# Patient Record
Sex: FEMALE | Race: WHITE | NOT HISPANIC OR LATINO | Employment: OTHER
[De-identification: names, ages, dates, MRNs, and addresses within clinical notes are randomized per-mention and may not be internally consistent; named-entity substitution may affect disease eponyms.]

---

## 2017-01-27 ENCOUNTER — FOLLOW UP (OUTPATIENT)
Age: 82
End: 2017-01-27

## 2017-01-30 ASSESSMENT — TONOMETRY
OD_IOP_MMHG: 13
OS_IOP_MMHG: 11

## 2017-01-30 ASSESSMENT — VISUAL ACUITY
OD_CC: 20/40
OS_CC: CF 3FT

## 2017-07-21 ENCOUNTER — FOLLOW UP (OUTPATIENT)
Age: 82
End: 2017-07-21

## 2017-07-21 NOTE — PROCEDURE NOTE: CLINICAL
PROCEDURE NOTE: Avastin () #12 OD. Intravitreal injection of Avastin 1.25mg/0.05 ml was given. Injection site: 3-4 mm from the limbus. Patient tolerated procedure well. CF vision checked. There were no complications. Post-op instructions given. Lot #: [email or web address]. Expiration Date: 9515-37-10T84:00:00. Inventory Id: viviana Henry PROCEDURE NOTE: Avastin () #12 OD. Diagnosis: Neovascular AMD with Active CNV. Intravitreal injection of Avastin 1.25mg/0.05 ml was given. Injection site: 3-4 mm from the limbus. Patient tolerated procedure well. CF vision checked. There were no complications. Post-op instructions given. Lot #: [id number]. Expiration Date: . EXP. Inventory Id: viviana Henry

## 2017-07-21 NOTE — PATIENT DISCUSSION
Avastin recommended today due to an increase in edema seen on exam and OCT today. I will see her again in 1 month for repeat exam and OCT, with the possibility of an Avastin. Avastin # 12 OD done.

## 2017-07-24 ASSESSMENT — VISUAL ACUITY
OS_SC: CF 2FT
OD_SC: 20/40

## 2017-07-24 ASSESSMENT — TONOMETRY
OD_IOP_MMHG: 17
OS_IOP_MMHG: 12
OD_IOP_MMHG: 13
OS_IOP_MMHG: 15

## 2017-07-26 ASSESSMENT — VISUAL ACUITY
OD_CC: 20/50+2
OS_CC: CF 1FT

## 2017-08-25 ENCOUNTER — FOLLOW UP (OUTPATIENT)
Age: 82
End: 2017-08-25

## 2017-08-25 NOTE — PROCEDURE NOTE: CLINICAL
PROCEDURE NOTE: Avastin () #13 OD. Diagnosis: Neovascular AMD with Active CNV. Anesthesia: Topical. Prep: Betadine Drops and Betadine Scrub. Intravitreal injection of Avastin 1.25mg/0.05 ml was given. Injection site: 3-4 mm from the limbus. Patient tolerated procedure well. CF vision checked. There were no complications. Post-op instructions given. Lot #: [email or web address]. Expiration Date: Mon Oct 16 2017 00:00:00 [facility]-[address] Daylight Time). Inventory Id: viviana Cuellar

## 2017-08-28 ASSESSMENT — TONOMETRY: OD_IOP_MMHG: 14

## 2017-08-28 ASSESSMENT — VISUAL ACUITY
OS_CC: CF 1FT
OD_CC: 20/40

## 2017-10-20 ENCOUNTER — FOLLOW UP (OUTPATIENT)
Age: 82
End: 2017-10-20

## 2017-10-20 NOTE — PATIENT DISCUSSION
Avastin recommended today after discussion of benefits, risks and alternatives. The injection was given and tolerated well by patient. Post-injection instructions were reviewed and understood by the patient. I would like to do 2 more Avastin injections over the next 2 month period.

## 2017-10-20 NOTE — PROCEDURE NOTE: CLINICAL
PROCEDURE NOTE: Avastin () #14 OD. Diagnosis: Neovascular AMD with Active CNV. Anesthesia: Topical. Prep: Betadine Drops and Betadine Scrub. Intravitreal injection of Avastin 1.25mg/0.05 ml was given. Injection site: 3-4 mm from the limbus. Patient tolerated procedure well. CF vision checked. There were no complications. Post-op instructions given. Lot #: [email or web address]. Expiration Date: . EXP. Inventory Id: srinath. Amie Jennings

## 2017-10-23 ASSESSMENT — VISUAL ACUITY
OS_CC: CF 1FT
OD_CC: 20/40-2

## 2017-10-23 ASSESSMENT — TONOMETRY: OD_IOP_MMHG: 14

## 2018-02-09 ENCOUNTER — FOLLOW UP (OUTPATIENT)
Age: 83
End: 2018-02-09

## 2018-02-12 ASSESSMENT — VISUAL ACUITY
OD_PH: 20/15
OD_CC: 20/40-1
OS_CC: CF 1FT

## 2018-02-12 ASSESSMENT — TONOMETRY: OD_IOP_MMHG: 14

## 2018-03-16 ENCOUNTER — FOLLOW UP (OUTPATIENT)
Age: 83
End: 2018-03-16

## 2018-03-19 ASSESSMENT — TONOMETRY: OD_IOP_MMHG: 18

## 2018-03-19 ASSESSMENT — VISUAL ACUITY
OD_CC: 20/40-1
OS_CC: CF 1FT

## 2018-05-18 ENCOUNTER — FOLLOW UP (OUTPATIENT)
Age: 83
End: 2018-05-18

## 2018-05-21 ASSESSMENT — TONOMETRY
OD_IOP_MMHG: 15
OS_IOP_MMHG: 13

## 2018-05-21 ASSESSMENT — VISUAL ACUITY
OD_CC: 20/40
OS_CC: CF 1FT

## 2018-08-17 ENCOUNTER — FOLLOW UP (OUTPATIENT)
Age: 83
End: 2018-08-17

## 2018-08-20 ASSESSMENT — TONOMETRY
OS_IOP_MMHG: 13
OD_IOP_MMHG: 13

## 2018-08-20 ASSESSMENT — VISUAL ACUITY
OD_CC: 20/40
OS_CC: CF 1FT

## 2018-12-14 ENCOUNTER — FOLLOW UP (OUTPATIENT)
Age: 83
End: 2018-12-14

## 2018-12-17 ASSESSMENT — VISUAL ACUITY
OD_CC: 20/40-2
OS_CC: CF 1FT

## 2018-12-17 ASSESSMENT — TONOMETRY
OD_IOP_MMHG: 12
OS_IOP_MMHG: 13

## 2019-04-05 ENCOUNTER — FOLLOW UP (OUTPATIENT)
Age: 84
End: 2019-04-05

## 2019-04-08 ASSESSMENT — VISUAL ACUITY
OD_CC: 20/50+2
OS_CC: CF 2FT

## 2019-04-08 ASSESSMENT — TONOMETRY
OD_IOP_MMHG: 17
OS_IOP_MMHG: 18

## 2019-07-26 ENCOUNTER — FOLLOW UP (OUTPATIENT)
Age: 84
End: 2019-07-26

## 2019-07-29 ASSESSMENT — VISUAL ACUITY
OD_CC: 20/40-2
OS_CC: CF 2FT

## 2019-07-29 ASSESSMENT — TONOMETRY
OS_IOP_MMHG: 15
OD_IOP_MMHG: 11

## 2020-01-17 ENCOUNTER — FOLLOW UP (OUTPATIENT)
Age: 85
End: 2020-01-17

## 2020-01-17 NOTE — PATIENT DISCUSSION
Recommended that Mrs. Hoffmann return to see me in 6 months. Mrs. Hoffmann had complaints of dry eye and I discussed using Page Rodriguez and gave her a prescription. She will think about this but will also consult with Dr. Viola Perdue.

## 2020-01-20 ASSESSMENT — TONOMETRY
OS_IOP_MMHG: 10
OD_IOP_MMHG: 13

## 2020-01-20 ASSESSMENT — VISUAL ACUITY
OS_SC: CF 3FT
OD_SC: 20/40

## 2020-08-03 ENCOUNTER — PREPPED CHART (OUTPATIENT)
Dept: URBAN - METROPOLITAN AREA CLINIC 20 | Facility: CLINIC | Age: 85
End: 2020-08-03

## 2020-08-07 ENCOUNTER — FOLLOW UP (OUTPATIENT)
Age: 85
End: 2020-08-07

## 2020-08-07 NOTE — PATIENT DISCUSSION
Mrs. Tim Pickard macular condition remains stable since her last visit. I have explained to her that I will be closing this office in the next few weeks. She would like to continue seeing me and will travel to the [facility] office.

## 2020-08-12 ASSESSMENT — VISUAL ACUITY
OS_CC: CF 1FT
OD_CC: 20/40-2

## 2020-08-12 ASSESSMENT — TONOMETRY
OD_IOP_MMHG: 12
OS_IOP_MMHG: 10

## 2021-02-02 ENCOUNTER — FOLLOW UP (OUTPATIENT)
Dept: URBAN - METROPOLITAN AREA CLINIC 11 | Facility: CLINIC | Age: 86
End: 2021-02-02

## 2021-02-02 DIAGNOSIS — H43.813: ICD-10-CM

## 2021-02-02 DIAGNOSIS — H35.3124: ICD-10-CM

## 2021-02-02 DIAGNOSIS — H35.3212: ICD-10-CM

## 2021-02-02 PROCEDURE — G9744 PT NOT ELI D/T ACT DIG HTN: HCPCS

## 2021-02-02 PROCEDURE — G8427 DOCREV CUR MEDS BY ELIG CLIN: HCPCS

## 2021-02-02 PROCEDURE — 99214 OFFICE O/P EST MOD 30 MIN: CPT

## 2021-02-02 PROCEDURE — G8756 NO BP MEASURE DOC: HCPCS

## 2021-02-02 PROCEDURE — 4177F TALK PT/CRGVR RE AREDS PREV: CPT

## 2021-02-02 PROCEDURE — 1036F TOBACCO NON-USER: CPT

## 2021-02-02 PROCEDURE — G9974 MAC EXAM PERF: HCPCS

## 2021-02-02 PROCEDURE — G9903 PT SCRN TBCO ID AS NON USER: HCPCS

## 2021-02-02 PROCEDURE — 92134 CPTRZ OPH DX IMG PST SGM RTA: CPT

## 2021-02-02 ASSESSMENT — VISUAL ACUITY
OD_CC: 20/50+2
OS_CC: CF 1FT

## 2021-02-02 ASSESSMENT — TONOMETRY
OS_IOP_MMHG: 11
OD_IOP_MMHG: 13

## 2021-02-02 NOTE — PATIENT DISCUSSION
35 minutes spent in face to face dilaogue with Mrs Hoffmann. I have explained that her macular condition remains stable since her last visit. I discussed the progression of her cataracts and patient feels comfortable at her current level of vision.  I have asked her to see me again in six months, or sooner, if she has any problems or concerns.

## 2021-08-31 ENCOUNTER — FOLLOW UP (OUTPATIENT)
Dept: URBAN - METROPOLITAN AREA CLINIC 11 | Facility: CLINIC | Age: 86
End: 2021-08-31

## 2021-08-31 DIAGNOSIS — H35.3212: ICD-10-CM

## 2021-08-31 DIAGNOSIS — H43.813: ICD-10-CM

## 2021-08-31 DIAGNOSIS — H35.3124: ICD-10-CM

## 2021-08-31 PROCEDURE — 99214 OFFICE O/P EST MOD 30 MIN: CPT

## 2021-08-31 PROCEDURE — 92134 CPTRZ OPH DX IMG PST SGM RTA: CPT

## 2021-08-31 ASSESSMENT — VISUAL ACUITY
OS_CC: CF 1FT
OD_CC: 20/60-2

## 2021-08-31 ASSESSMENT — TONOMETRY
OD_IOP_MMHG: 15
OS_IOP_MMHG: 12

## 2021-08-31 NOTE — PATIENT DISCUSSION
35 minutes spent in face to face dilaogue with Mrs Hoffmann. I have explained that her macular condition remains stable since her last visit. I discussed the progression of her cataracts and I have recommended against cataract surgery.  I would like for her to see me again in six months, or sooner, if she has any problems or concerns.

## 2022-01-18 ASSESSMENT — TONOMETRY
OS_IOP_MMHG: 11
OD_IOP_MMHG: 11

## 2022-01-18 ASSESSMENT — VISUAL ACUITY: OD_CC: 20/60

## 2022-01-19 ENCOUNTER — ESTABLISHED PATIENT (OUTPATIENT)
Dept: URBAN - METROPOLITAN AREA CLINIC 20 | Facility: CLINIC | Age: 87
End: 2022-01-19

## 2022-01-19 DIAGNOSIS — H35.3133: ICD-10-CM

## 2022-01-19 DIAGNOSIS — H52.13: ICD-10-CM

## 2022-01-19 PROCEDURE — 92015 DETERMINE REFRACTIVE STATE: CPT

## 2022-01-19 PROCEDURE — 92014 COMPRE OPH EXAM EST PT 1/>: CPT

## 2022-01-19 ASSESSMENT — TONOMETRY
OS_IOP_MMHG: 8
OD_IOP_MMHG: 11

## 2022-01-19 ASSESSMENT — KERATOMETRY
OS_AXISANGLE_DEGREES: 87
OD_K2POWER_DIOPTERS: 43.50
OD_AXISANGLE_DEGREES: 165
OS_K2POWER_DIOPTERS: 43.50
OS_K1POWER_DIOPTERS: 42.75
OS_AXISANGLE2_DEGREES: 177
OD_AXISANGLE2_DEGREES: 75
OD_K1POWER_DIOPTERS: 43.00

## 2022-01-19 ASSESSMENT — VISUAL ACUITY
OU_SC: J3
OD_CC: 20/50

## 2022-01-19 NOTE — PATIENT DISCUSSION
(Stable): Discussed the difference between dry and wet macular degeneration. Retina appears stable at today's exam. Continue monitoring at home [address] once weekly. Use UV protection when outside, do not smoke, and add more leafy green vegetables to diet. Reviewed instructions for [employer] use. Call our office immediately if any changes occur on the [address]. Monitor.

## 2022-05-26 ENCOUNTER — LAB OUTSIDE AN ENCOUNTER (OUTPATIENT)
Dept: URBAN - METROPOLITAN AREA CLINIC 72 | Facility: CLINIC | Age: 87
End: 2022-05-26

## 2022-05-26 ENCOUNTER — OFFICE VISIT (OUTPATIENT)
Dept: URBAN - METROPOLITAN AREA CLINIC 72 | Facility: CLINIC | Age: 87
End: 2022-05-26
Payer: MEDICARE

## 2022-05-26 VITALS
TEMPERATURE: 98.4 F | BODY MASS INDEX: 22.88 KG/M2 | HEIGHT: 64 IN | DIASTOLIC BLOOD PRESSURE: 77 MMHG | SYSTOLIC BLOOD PRESSURE: 152 MMHG | HEART RATE: 68 BPM | WEIGHT: 134 LBS

## 2022-05-26 DIAGNOSIS — K21.9 GASTROESOPHAGEAL REFLUX DISEASE, UNSPECIFIED WHETHER ESOPHAGITIS PRESENT: ICD-10-CM

## 2022-05-26 DIAGNOSIS — R13.19 ESOPHAGEAL DYSPHAGIA: ICD-10-CM

## 2022-05-26 PROCEDURE — 99204 OFFICE O/P NEW MOD 45 MIN: CPT | Performed by: INTERNAL MEDICINE

## 2022-05-26 RX ORDER — VIT A/VIT C/VIT E/ZINC/COPPER 4296-226
AS DIRECTED CAPSULE ORAL
Status: ACTIVE | COMMUNITY

## 2022-05-26 RX ORDER — IRBESARTAN 300 MG/1
1 TABLET TABLET, FILM COATED ORAL ONCE A DAY
Status: ACTIVE | COMMUNITY

## 2022-05-26 RX ORDER — GABAPENTIN 300 MG/1
1 CAPSULE CAPSULE ORAL ONCE A DAY
Status: ACTIVE | COMMUNITY

## 2022-05-26 RX ORDER — SIMVASTATIN 20 MG/1
1 TABLET IN THE EVENING TABLET, FILM COATED ORAL ONCE A DAY
Status: ACTIVE | COMMUNITY

## 2022-05-26 RX ORDER — OMEPRAZOLE 20 MG/1
1 CAPSULE 30 MINUTES BEFORE MORNING MEAL CAPSULE, DELAYED RELEASE ORAL ONCE A DAY
Status: ACTIVE | COMMUNITY

## 2022-05-26 RX ORDER — CARVEDILOL 12.5 MG/1
1 TABLET WITH FOOD TABLET, FILM COATED ORAL TWICE A DAY
Status: ACTIVE | COMMUNITY

## 2022-05-26 RX ORDER — LEVOTHYROXINE SODIUM 25 UG/1
1 TABLET IN THE MORNING ON AN EMPTY STOMACH TABLET ORAL ONCE A DAY
Status: ACTIVE | COMMUNITY

## 2022-05-26 RX ORDER — ZOLPIDEM TARTRATE 10 MG/1
1 TABLET AT BEDTIME AS NEEDED TABLET, FILM COATED ORAL ONCE A DAY
Status: ACTIVE | COMMUNITY

## 2022-05-26 NOTE — HPI-TODAY'S VISIT:
Mrs. Bassett is a pleasant 86-year-old who presents as a new patient for consultation for diarrhea.  Lab work from 2/10/2022 available for review showed WBC 7.8, hemoglobin 13.6, platelets 253, creatinine 1.03, sodium 146 normal liver associated enzymes   She reports a history of irritable bowel syndrome.  She has a longstanding issue of reflux, on omeprazole 20 mg daily.  Has a strong family history of GI cancers mostly gastric.  She has had issues with dysphagia but worsened over the last 6 months with regurgitation of food.  She notes that she has had intermittent dilatations in the past.  Feels she is about due for 1.  Denies any weight loss.  Has had some worsening heartburn.

## 2022-05-27 ENCOUNTER — OFFICE VISIT (OUTPATIENT)
Dept: URBAN - METROPOLITAN AREA MEDICAL CENTER 40 | Facility: MEDICAL CENTER | Age: 87
End: 2022-05-27
Payer: MEDICARE

## 2022-05-27 DIAGNOSIS — R13.19 DYSPHAGIA: ICD-10-CM

## 2022-05-27 DIAGNOSIS — K31.A12 INTESTINAL METAPLASIA OF BODY OF STOMACH WITHOUT DYSPLASIA: ICD-10-CM

## 2022-05-27 DIAGNOSIS — K22.70 BARRETT ESOPHAGUS: ICD-10-CM

## 2022-05-27 DIAGNOSIS — K31.89 GASTRIC MASS: ICD-10-CM

## 2022-05-27 DIAGNOSIS — K21.9 ACID REFLUX: ICD-10-CM

## 2022-05-27 DIAGNOSIS — K29.60 ADENOPAPILLOMATOSIS GASTRICA: ICD-10-CM

## 2022-05-27 PROCEDURE — 43249 ESOPH EGD DILATION <30 MM: CPT | Performed by: INTERNAL MEDICINE

## 2022-05-27 PROCEDURE — 43239 EGD BIOPSY SINGLE/MULTIPLE: CPT | Performed by: INTERNAL MEDICINE

## 2022-06-15 ENCOUNTER — CLAIMS CREATED FROM THE CLAIM WINDOW (OUTPATIENT)
Dept: URBAN - METROPOLITAN AREA CLINIC 72 | Facility: CLINIC | Age: 87
End: 2022-06-15
Payer: MEDICARE

## 2022-06-15 ENCOUNTER — WEB ENCOUNTER (OUTPATIENT)
Dept: URBAN - METROPOLITAN AREA CLINIC 72 | Facility: CLINIC | Age: 87
End: 2022-06-15

## 2022-06-15 VITALS
SYSTOLIC BLOOD PRESSURE: 158 MMHG | WEIGHT: 135.4 LBS | HEIGHT: 64 IN | DIASTOLIC BLOOD PRESSURE: 83 MMHG | BODY MASS INDEX: 23.12 KG/M2 | TEMPERATURE: 98.7 F | HEART RATE: 81 BPM

## 2022-06-15 DIAGNOSIS — R10.32 LLQ PAIN: ICD-10-CM

## 2022-06-15 DIAGNOSIS — K31.89 SUBMUCOSAL LESION OF STOMACH: ICD-10-CM

## 2022-06-15 DIAGNOSIS — K21.9 GASTROESOPHAGEAL REFLUX DISEASE, UNSPECIFIED WHETHER ESOPHAGITIS PRESENT: ICD-10-CM

## 2022-06-15 DIAGNOSIS — R13.19 ESOPHAGEAL DYSPHAGIA: ICD-10-CM

## 2022-06-15 DIAGNOSIS — K22.70 BARRETT'S ESOPHAGUS WITHOUT DYSPLASIA: ICD-10-CM

## 2022-06-15 PROCEDURE — 99214 OFFICE O/P EST MOD 30 MIN: CPT | Performed by: INTERNAL MEDICINE

## 2022-06-15 RX ORDER — VIT A/VIT C/VIT E/ZINC/COPPER 4296-226
AS DIRECTED CAPSULE ORAL
Status: ACTIVE | COMMUNITY

## 2022-06-15 RX ORDER — LEVOTHYROXINE SODIUM 25 UG/1
1 TABLET IN THE MORNING ON AN EMPTY STOMACH TABLET ORAL ONCE A DAY
Status: ACTIVE | COMMUNITY

## 2022-06-15 RX ORDER — TRAMADOL HYDROCHLORIDE 5 MG/ML
5 ML 1 TO 3 HOURS BEFORE SEXUAL ACTIVITY AS NEEDED SOLUTION ORAL ONCE A DAY
Status: ACTIVE | COMMUNITY

## 2022-06-15 RX ORDER — OMEPRAZOLE 20 MG/1
1 CAPSULE 30 MINUTES BEFORE MORNING MEAL CAPSULE, DELAYED RELEASE ORAL ONCE A DAY
Status: ACTIVE | COMMUNITY

## 2022-06-15 RX ORDER — SIMVASTATIN 20 MG/1
1 TABLET IN THE EVENING TABLET, FILM COATED ORAL ONCE A DAY
Status: ACTIVE | COMMUNITY

## 2022-06-15 RX ORDER — GABAPENTIN 300 MG/1
1 CAPSULE CAPSULE ORAL ONCE A DAY
Status: ACTIVE | COMMUNITY

## 2022-06-15 RX ORDER — IRBESARTAN 300 MG/1
1 TABLET TABLET, FILM COATED ORAL ONCE A DAY
Status: DISCONTINUED | COMMUNITY

## 2022-06-15 RX ORDER — CARVEDILOL 12.5 MG/1
1 TABLET WITH FOOD TABLET, FILM COATED ORAL TWICE A DAY
Status: ACTIVE | COMMUNITY

## 2022-06-15 RX ORDER — ZOLPIDEM TARTRATE 10 MG/1
1 TABLET AT BEDTIME AS NEEDED TABLET, FILM COATED ORAL ONCE A DAY
Status: ACTIVE | COMMUNITY

## 2022-06-15 NOTE — HPI-TODAY'S VISIT:
Mrs. Bassett returns for follow-up.  Recall she is an 86-year-old last seen in our office on 5/26/2022 for consultation for diarrhea.  She has a history of irritable bowel syndrome.  She reported strong family history of gastric cancers and had a personal history of reflux despite omeprazole 20 mg daily.  She noted intermittent issues with dysphagia that had worsened over the last 6 months.  She has required intermittent dilatations in the past and felt like she was ready to have 1.  She ultimately underwent an EGD on 5/27/2022, this was performed without difficulty, there was gastritis throughout the stomach with continued mucosa with erosion in nodularity severe in nature.  A large submucosal mass was identified and not biopsied.  The esophagus was normal with GE junction at 39 cm from the incisors.  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  A repeat EGD was recommended in 3 years if healthy has not.  We discussed consideration of CT scan for the large submucosal mass.She was dilated during her upper endoscopy, reports that since that time she is actually having more issues with dysphagia than before.  Eats food feels like it gets stuck and has to be regurgitated for drink lots of water to get it to go down.  She has never had a barium esophagram to her knowledge. In addition she reports that she has been experiencing left lower quadrant pain, initially started earlier this month but has worsened.  Comes and goes in the left lower quadrant sharp at times.

## 2022-07-11 ENCOUNTER — TELEPHONE ENCOUNTER (OUTPATIENT)
Dept: URBAN - METROPOLITAN AREA CLINIC 72 | Facility: CLINIC | Age: 87
End: 2022-07-11

## 2022-07-12 ENCOUNTER — TELEPHONE ENCOUNTER (OUTPATIENT)
Dept: URBAN - METROPOLITAN AREA CLINIC 72 | Facility: CLINIC | Age: 87
End: 2022-07-12

## 2022-08-09 ENCOUNTER — TELEPHONE ENCOUNTER (OUTPATIENT)
Dept: URBAN - METROPOLITAN AREA CLINIC 72 | Facility: CLINIC | Age: 87
End: 2022-08-09

## 2022-08-15 ENCOUNTER — OFFICE VISIT (OUTPATIENT)
Dept: URBAN - METROPOLITAN AREA MEDICAL CENTER 40 | Facility: MEDICAL CENTER | Age: 87
End: 2022-08-15
Payer: MEDICARE

## 2022-08-15 DIAGNOSIS — R93.3 ABN FINDINGS-GI TRACT: ICD-10-CM

## 2022-08-15 DIAGNOSIS — K31.89 SUBMUCOSAL LESION OF STOMACH: ICD-10-CM

## 2022-08-15 PROCEDURE — 43239 EGD BIOPSY SINGLE/MULTIPLE: CPT | Performed by: INTERNAL MEDICINE

## 2022-08-23 ENCOUNTER — TELEPHONE ENCOUNTER (OUTPATIENT)
Dept: URBAN - METROPOLITAN AREA CLINIC 72 | Facility: CLINIC | Age: 87
End: 2022-08-23

## 2022-08-23 ENCOUNTER — LAB OUTSIDE AN ENCOUNTER (OUTPATIENT)
Dept: URBAN - METROPOLITAN AREA CLINIC 72 | Facility: CLINIC | Age: 87
End: 2022-08-23

## 2022-08-31 ENCOUNTER — TELEPHONE ENCOUNTER (OUTPATIENT)
Dept: URBAN - METROPOLITAN AREA CLINIC 113 | Facility: CLINIC | Age: 87
End: 2022-08-31

## 2022-09-13 ENCOUNTER — OFFICE VISIT (OUTPATIENT)
Dept: URBAN - METROPOLITAN AREA MEDICAL CENTER 2 | Facility: MEDICAL CENTER | Age: 87
End: 2022-09-13
Payer: MEDICARE

## 2022-09-13 DIAGNOSIS — C49.A2 GASTRIC STROMAL TUMOR: ICD-10-CM

## 2022-09-13 PROCEDURE — 43238 EGD US FINE NEEDLE BX/ASPIR: CPT | Performed by: INTERNAL MEDICINE

## 2022-09-13 RX ORDER — CARVEDILOL 12.5 MG/1
1 TABLET WITH FOOD TABLET, FILM COATED ORAL TWICE A DAY
Status: ACTIVE | COMMUNITY

## 2022-09-13 RX ORDER — OMEPRAZOLE 20 MG/1
1 CAPSULE 30 MINUTES BEFORE MORNING MEAL CAPSULE, DELAYED RELEASE ORAL ONCE A DAY
Status: ACTIVE | COMMUNITY

## 2022-09-13 RX ORDER — LEVOTHYROXINE SODIUM 25 UG/1
1 TABLET IN THE MORNING ON AN EMPTY STOMACH TABLET ORAL ONCE A DAY
Status: ACTIVE | COMMUNITY

## 2022-09-13 RX ORDER — SIMVASTATIN 20 MG/1
1 TABLET IN THE EVENING TABLET, FILM COATED ORAL ONCE A DAY
Status: ACTIVE | COMMUNITY

## 2022-09-13 RX ORDER — VIT A/VIT C/VIT E/ZINC/COPPER 4296-226
AS DIRECTED CAPSULE ORAL
Status: ACTIVE | COMMUNITY

## 2022-09-13 RX ORDER — TRAMADOL HYDROCHLORIDE 5 MG/ML
5 ML 1 TO 3 HOURS BEFORE SEXUAL ACTIVITY AS NEEDED SOLUTION ORAL ONCE A DAY
Status: ACTIVE | COMMUNITY

## 2022-09-13 RX ORDER — ZOLPIDEM TARTRATE 10 MG/1
1 TABLET AT BEDTIME AS NEEDED TABLET, FILM COATED ORAL ONCE A DAY
Status: ACTIVE | COMMUNITY

## 2022-09-13 RX ORDER — GABAPENTIN 300 MG/1
1 CAPSULE CAPSULE ORAL ONCE A DAY
Status: ACTIVE | COMMUNITY

## 2022-09-15 ENCOUNTER — TELEPHONE ENCOUNTER (OUTPATIENT)
Dept: URBAN - METROPOLITAN AREA CLINIC 113 | Facility: CLINIC | Age: 87
End: 2022-09-15

## 2022-09-17 ENCOUNTER — TELEPHONE ENCOUNTER (OUTPATIENT)
Dept: URBAN - METROPOLITAN AREA CLINIC 113 | Facility: CLINIC | Age: 87
End: 2022-09-17

## 2022-09-19 ENCOUNTER — TELEPHONE ENCOUNTER (OUTPATIENT)
Dept: URBAN - METROPOLITAN AREA CLINIC 72 | Facility: CLINIC | Age: 87
End: 2022-09-19

## 2022-09-20 ENCOUNTER — OFFICE VISIT (OUTPATIENT)
Dept: URBAN - METROPOLITAN AREA CLINIC 72 | Facility: CLINIC | Age: 87
End: 2022-09-20
Payer: MEDICARE

## 2022-09-20 VITALS
WEIGHT: 128 LBS | BODY MASS INDEX: 21.85 KG/M2 | SYSTOLIC BLOOD PRESSURE: 141 MMHG | HEIGHT: 64 IN | TEMPERATURE: 98.7 F | HEART RATE: 93 BPM | DIASTOLIC BLOOD PRESSURE: 87 MMHG | RESPIRATION RATE: 22 BRPM

## 2022-09-20 DIAGNOSIS — R19.7 DIARRHEA, UNSPECIFIED TYPE: ICD-10-CM

## 2022-09-20 DIAGNOSIS — C49.A2 MALIGNANT GASTROINTESTINAL STROMAL TUMOR (GIST) OF STOMACH: ICD-10-CM

## 2022-09-20 PROCEDURE — 99214 OFFICE O/P EST MOD 30 MIN: CPT | Performed by: INTERNAL MEDICINE

## 2022-09-20 RX ORDER — VIT A/VIT C/VIT E/ZINC/COPPER 4296-226
AS DIRECTED CAPSULE ORAL
Status: ACTIVE | COMMUNITY

## 2022-09-20 RX ORDER — OMEPRAZOLE 20 MG/1
1 CAPSULE 30 MINUTES BEFORE MORNING MEAL CAPSULE, DELAYED RELEASE ORAL ONCE A DAY
Status: ACTIVE | COMMUNITY

## 2022-09-20 RX ORDER — GABAPENTIN 300 MG/1
1 CAPSULE CAPSULE ORAL ONCE A DAY
Status: ACTIVE | COMMUNITY

## 2022-09-20 RX ORDER — ZOLPIDEM TARTRATE 10 MG/1
1 TABLET AT BEDTIME AS NEEDED TABLET, FILM COATED ORAL ONCE A DAY
Status: ACTIVE | COMMUNITY

## 2022-09-20 RX ORDER — LEVOTHYROXINE SODIUM 25 UG/1
1 TABLET IN THE MORNING ON AN EMPTY STOMACH TABLET ORAL ONCE A DAY
Status: ACTIVE | COMMUNITY

## 2022-09-20 RX ORDER — SIMVASTATIN 20 MG/1
1 TABLET IN THE EVENING TABLET, FILM COATED ORAL ONCE A DAY
Status: ACTIVE | COMMUNITY

## 2022-09-20 RX ORDER — DULOXETINE 20 MG/1
1 CAPSULE CAPSULE, DELAYED RELEASE PELLETS ORAL TWICE A DAY
Status: ACTIVE | COMMUNITY

## 2022-09-20 RX ORDER — DIPHENOXYLATE HYDROCHLORIDE AND ATROPINE SULFATE 2.5; .025 MG/1; MG/1
1 TABLET AS NEEDED TABLET ORAL
Qty: 56 TABLET | Refills: 0 | OUTPATIENT

## 2022-09-20 RX ORDER — CARVEDILOL 12.5 MG/1
1 TABLET WITH FOOD TABLET, FILM COATED ORAL TWICE A DAY
Status: ACTIVE | COMMUNITY

## 2022-09-20 RX ORDER — TRAMADOL HYDROCHLORIDE 5 MG/ML
5 ML 1 TO 3 HOURS BEFORE SEXUAL ACTIVITY AS NEEDED SOLUTION ORAL ONCE A DAY
Status: ACTIVE | COMMUNITY

## 2022-09-20 NOTE — HPI-TODAY'S VISIT:
Ms. Bassett returns for follow-up.  Recall she is a pleasant 87-year-old female last seen in office on 6/18/2022.  She has a history of IBS, reflux and strong family history of gastric cancer.  She suffered from intermittent dysphagia and underwent an EGD in May 2022 showed gastritis with nodularity severe nature is also a large submucosal mass identified that was not biopsied initially due to pulsatile nature.  Pathology showed inactive gastritis with intestinal metaplasia negative for H. pylori and Ramirez's mucosa was seen on the soft cast.  We discussed the CT scan for the large mucosal mass, we ultimately decided to proceed with a repeat EGD to attempt a biopsy of the area, of note this area was highly concerning for GIST as it appeared to be submucosal.  Repeat biopsies performed showed no obvious neoplastic tissue but an EUS was ordered.  This was performed on 9/13/2022 with biopsies confirming presence of GIST.  Unfortunately patient has also been experiencing copious diarrhea

## 2022-09-21 ENCOUNTER — TELEPHONE ENCOUNTER (OUTPATIENT)
Dept: URBAN - METROPOLITAN AREA CLINIC 72 | Facility: CLINIC | Age: 87
End: 2022-09-21

## 2022-12-07 ENCOUNTER — TELEPHONE ENCOUNTER (OUTPATIENT)
Dept: URBAN - METROPOLITAN AREA CLINIC 113 | Facility: CLINIC | Age: 87
End: 2022-12-07

## 2022-12-21 ENCOUNTER — WEB ENCOUNTER (OUTPATIENT)
Dept: URBAN - METROPOLITAN AREA CLINIC 72 | Facility: CLINIC | Age: 87
End: 2022-12-21

## 2022-12-21 ENCOUNTER — OFFICE VISIT (OUTPATIENT)
Dept: URBAN - METROPOLITAN AREA CLINIC 72 | Facility: CLINIC | Age: 87
End: 2022-12-21
Payer: MEDICARE

## 2022-12-21 VITALS — BODY MASS INDEX: 23.22 KG/M2 | HEIGHT: 64 IN | WEIGHT: 136 LBS

## 2022-12-21 DIAGNOSIS — R19.7 DIARRHEA, UNSPECIFIED TYPE: ICD-10-CM

## 2022-12-21 DIAGNOSIS — K31.89 SUBMUCOSAL LESION OF STOMACH: ICD-10-CM

## 2022-12-21 DIAGNOSIS — R13.19 ESOPHAGEAL DYSPHAGIA: ICD-10-CM

## 2022-12-21 DIAGNOSIS — K22.70 BARRETT'S ESOPHAGUS WITHOUT DYSPLASIA: ICD-10-CM

## 2022-12-21 DIAGNOSIS — C49.A2 MALIGNANT GASTROINTESTINAL STROMAL TUMOR (GIST) OF STOMACH: ICD-10-CM

## 2022-12-21 DIAGNOSIS — K21.9 ACID REFLUX: ICD-10-CM

## 2022-12-21 PROBLEM — 363349007: Status: ACTIVE | Noted: 2022-09-20

## 2022-12-21 PROCEDURE — 99213 OFFICE O/P EST LOW 20 MIN: CPT | Performed by: INTERNAL MEDICINE

## 2022-12-21 RX ORDER — TRAMADOL HYDROCHLORIDE 5 MG/ML
5 ML 1 TO 3 HOURS BEFORE SEXUAL ACTIVITY AS NEEDED SOLUTION ORAL ONCE A DAY
Status: ACTIVE | COMMUNITY

## 2022-12-21 RX ORDER — DIPHENOXYLATE HYDROCHLORIDE AND ATROPINE SULFATE 2.5; .025 MG/1; MG/1
1 TABLET AS NEEDED TABLET ORAL
Qty: 56 TABLET | Refills: 0 | Status: ACTIVE | COMMUNITY

## 2022-12-21 RX ORDER — SIMVASTATIN 20 MG/1
1 TABLET IN THE EVENING TABLET, FILM COATED ORAL ONCE A DAY
Status: ACTIVE | COMMUNITY

## 2022-12-21 RX ORDER — CARVEDILOL 12.5 MG/1
1 TABLET WITH FOOD TABLET, FILM COATED ORAL TWICE A DAY
Status: ACTIVE | COMMUNITY

## 2022-12-21 RX ORDER — DULOXETINE 20 MG/1
1 CAPSULE CAPSULE, DELAYED RELEASE PELLETS ORAL TWICE A DAY
Status: ON HOLD | COMMUNITY

## 2022-12-21 RX ORDER — OMEPRAZOLE 20 MG/1
1 CAPSULE 30 MINUTES BEFORE MORNING MEAL CAPSULE, DELAYED RELEASE ORAL ONCE A DAY
Status: ACTIVE | COMMUNITY

## 2022-12-21 RX ORDER — LEVOTHYROXINE SODIUM 25 UG/1
1 TABLET IN THE MORNING ON AN EMPTY STOMACH TABLET ORAL ONCE A DAY
Status: ACTIVE | COMMUNITY

## 2022-12-21 RX ORDER — VIT A/VIT C/VIT E/ZINC/COPPER 4296-226
AS DIRECTED CAPSULE ORAL
Status: ACTIVE | COMMUNITY

## 2022-12-21 RX ORDER — GABAPENTIN 300 MG/1
1 CAPSULE CAPSULE ORAL ONCE A DAY
Status: ACTIVE | COMMUNITY

## 2022-12-21 RX ORDER — ZOLPIDEM TARTRATE 10 MG/1
1 TABLET AT BEDTIME AS NEEDED TABLET, FILM COATED ORAL ONCE A DAY
Status: ACTIVE | COMMUNITY

## 2022-12-21 NOTE — HPI-TODAY'S VISIT:
Ms. Bassett returns for follow-up, recall she is an 87-year-old last seen in office on 9/20/2022.  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  She is unfortunately been suffering from chronic diarrhea with this.  She is followed by oncology, and was placed on Gleevec.  She tried 200 mg Gleevec for 3 weeks reported blurry vision while on the medication.  She also developed atrial fibrillation and felt like it was related to the medication.  She was hesitant to continue the medication due to the symptoms.  She reports that she has been doing well, is asymptomatic with no further issues with diarrhea.  We discussed the options moving forward including EGD with autoimmune and Endoloop versus periodic EGD or CT scan for reassessment.  She is not interested in anything invasive at this point time and opted for periodic imaging for surveillance.

## 2022-12-27 ENCOUNTER — OFFICE VISIT (OUTPATIENT)
Dept: URBAN - METROPOLITAN AREA CLINIC 72 | Facility: CLINIC | Age: 87
End: 2022-12-27

## 2023-02-03 ENCOUNTER — LAB OUTSIDE AN ENCOUNTER (OUTPATIENT)
Dept: URBAN - METROPOLITAN AREA CLINIC 72 | Facility: CLINIC | Age: 88
End: 2023-02-03

## 2023-02-03 ENCOUNTER — OFFICE VISIT (OUTPATIENT)
Dept: URBAN - METROPOLITAN AREA CLINIC 72 | Facility: CLINIC | Age: 88
End: 2023-02-03
Payer: MEDICARE

## 2023-02-03 VITALS
HEART RATE: 79 BPM | SYSTOLIC BLOOD PRESSURE: 128 MMHG | BODY MASS INDEX: 21.17 KG/M2 | WEIGHT: 124 LBS | TEMPERATURE: 98.2 F | HEIGHT: 64 IN | DIASTOLIC BLOOD PRESSURE: 77 MMHG

## 2023-02-03 DIAGNOSIS — R10.32 LEFT LOWER QUADRANT ABDOMINAL PAIN: ICD-10-CM

## 2023-02-03 DIAGNOSIS — K22.70 BARRETT'S ESOPHAGUS WITHOUT DYSPLASIA: ICD-10-CM

## 2023-02-03 DIAGNOSIS — R19.8 ALTERED BOWEL FUNCTION: ICD-10-CM

## 2023-02-03 DIAGNOSIS — K21.9 GASTROESOPHAGEAL REFLUX DISEASE, UNSPECIFIED WHETHER ESOPHAGITIS PRESENT: ICD-10-CM

## 2023-02-03 DIAGNOSIS — K31.89 SUBMUCOSAL LESION OF STOMACH: ICD-10-CM

## 2023-02-03 DIAGNOSIS — R13.19 ESOPHAGEAL DYSPHAGIA: ICD-10-CM

## 2023-02-03 DIAGNOSIS — R63.4 UNINTENTIONAL WEIGHT LOSS: ICD-10-CM

## 2023-02-03 DIAGNOSIS — K57.30 DIVERTICULA OF COLON: ICD-10-CM

## 2023-02-03 PROBLEM — 733657002: Status: ACTIVE | Noted: 2023-02-03

## 2023-02-03 PROCEDURE — 99214 OFFICE O/P EST MOD 30 MIN: CPT | Performed by: NURSE PRACTITIONER

## 2023-02-03 RX ORDER — TRAMADOL HYDROCHLORIDE 5 MG/ML
5 ML 1 TO 3 HOURS BEFORE SEXUAL ACTIVITY AS NEEDED SOLUTION ORAL ONCE A DAY
Status: ACTIVE | COMMUNITY

## 2023-02-03 RX ORDER — CARVEDILOL 12.5 MG/1
1 TABLET WITH FOOD TABLET, FILM COATED ORAL TWICE A DAY
Status: ACTIVE | COMMUNITY

## 2023-02-03 RX ORDER — VIT A/VIT C/VIT E/ZINC/COPPER 4296-226
AS DIRECTED CAPSULE ORAL
Status: ACTIVE | COMMUNITY

## 2023-02-03 RX ORDER — DULOXETINE 20 MG/1
1 CAPSULE CAPSULE, DELAYED RELEASE PELLETS ORAL TWICE A DAY
Status: ON HOLD | COMMUNITY

## 2023-02-03 RX ORDER — DIPHENOXYLATE HYDROCHLORIDE AND ATROPINE SULFATE 2.5; .025 MG/1; MG/1
1 TABLET AS NEEDED TABLET ORAL
Qty: 56 TABLET | Refills: 0 | Status: ACTIVE | COMMUNITY

## 2023-02-03 RX ORDER — ZOLPIDEM TARTRATE 10 MG/1
1 TABLET AT BEDTIME AS NEEDED TABLET, FILM COATED ORAL ONCE A DAY
Status: ACTIVE | COMMUNITY

## 2023-02-03 RX ORDER — LEVOTHYROXINE SODIUM 25 UG/1
1 TABLET IN THE MORNING ON AN EMPTY STOMACH TABLET ORAL ONCE A DAY
Status: ACTIVE | COMMUNITY

## 2023-02-03 RX ORDER — GABAPENTIN 300 MG/1
1 CAPSULE CAPSULE ORAL ONCE A DAY
Status: ACTIVE | COMMUNITY

## 2023-02-03 RX ORDER — SIMVASTATIN 20 MG/1
1 TABLET IN THE EVENING TABLET, FILM COATED ORAL ONCE A DAY
Status: ON HOLD | COMMUNITY

## 2023-02-03 RX ORDER — OMEPRAZOLE 20 MG/1
1 CAPSULE 30 MINUTES BEFORE MORNING MEAL CAPSULE, DELAYED RELEASE ORAL ONCE A DAY
Status: ACTIVE | COMMUNITY

## 2023-02-03 NOTE — PHYSICAL EXAM GASTROINTESTINAL
soft, minimal tenderness LLQ,  no guarding or rigidity,  nondistended , normal bowel sounds , no rebound tenderness

## 2023-02-03 NOTE — HPI-TODAY'S VISIT:
87-year-old female here for abdominal bloating and pain  Last seen 12/21/2022 for history of malignant GIST of stomach.  Recommend arranging for CT scan in 4 to 6 months.  Also has a history of Ramirez's esophagus.  Repeat EGD in the next year for surveillance.  Diarrhea had resolved.  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  She is unfortunately been suffering from chronic diarrhea with this.  She is followed by oncology Dr. Mccauley, and was placed on Gleevec.  She tried 200 mg Gleevec for 3 weeks reported blurry vision while on the medication.  She also developed atrial fibrillation and felt like it was related to the medication.  She was hesitant to continue the medication due to the symptoms.   On interview she is here with her daughter.  She repors trouble swallwing, bloating and abdominal pain. She feels something is different and discomfort is LLQ.  She reports feeling bubbles in her stomach.  Pt states this has been going on for a few weeks. States has also had some constipation. Pt states she has a bowel movement everyday but has to strain. Pt states abdominal pain is on left side and its a sharp pain/ache. Takes Lomotil once a month for diarrhea, tramadol PRN for back pain.  She reports daily BM to me.  Has a back ablation scheduled in 2 weeks. Her weight is down 12 pounds from her last appointment.   No CP, SOB, palpitations, syncope, near-syncope or problems with anesthesia previously.

## 2023-02-07 PROBLEM — 302914006: Status: ACTIVE | Noted: 2022-06-15

## 2023-02-07 PROBLEM — 40890009 ESOPHAGEAL DYSPHAGIA: Status: ACTIVE | Noted: 2022-08-09

## 2023-02-07 PROBLEM — 300296006: Status: ACTIVE | Noted: 2022-07-11

## 2023-02-24 ENCOUNTER — OFFICE VISIT (OUTPATIENT)
Dept: URBAN - METROPOLITAN AREA MEDICAL CENTER 40 | Facility: MEDICAL CENTER | Age: 88
End: 2023-02-24
Payer: MEDICARE

## 2023-02-24 DIAGNOSIS — K21.9 GASTROESOPHAGEAL REFLUX DISEASE, UNSPECIFIED WHETHER ESOPHAGITIS PRESENT: ICD-10-CM

## 2023-02-24 DIAGNOSIS — K22.10 EROSIVE ESOPHAGITIS: ICD-10-CM

## 2023-02-24 DIAGNOSIS — K31.A15 INTESTINAL METAPLASIA OF STOMACH INVOLVING MULTIPLE SITES WITHOUT DYSPLASIA: ICD-10-CM

## 2023-02-24 DIAGNOSIS — K31.89 REACTIVE GASTROPATHY: ICD-10-CM

## 2023-02-24 DIAGNOSIS — K22.89 DILATION OF ESOPHAGUS: ICD-10-CM

## 2023-02-24 PROCEDURE — 43239 EGD BIOPSY SINGLE/MULTIPLE: CPT | Performed by: INTERNAL MEDICINE

## 2023-03-06 PROBLEM — 235595009 GASTROESOPHAGEAL REFLUX DISEASE: Status: ACTIVE | Noted: 2022-05-26

## 2023-03-06 PROBLEM — 40719004 EROSIVE ESOPHAGITIS: Status: ACTIVE | Noted: 2023-03-06

## 2023-03-15 ENCOUNTER — OFFICE VISIT (OUTPATIENT)
Dept: URBAN - METROPOLITAN AREA CLINIC 72 | Facility: CLINIC | Age: 88
End: 2023-03-15
Payer: MEDICARE

## 2023-03-15 ENCOUNTER — WEB ENCOUNTER (OUTPATIENT)
Dept: URBAN - METROPOLITAN AREA CLINIC 72 | Facility: CLINIC | Age: 88
End: 2023-03-15

## 2023-03-15 VITALS
DIASTOLIC BLOOD PRESSURE: 62 MMHG | WEIGHT: 131.8 LBS | BODY MASS INDEX: 22.5 KG/M2 | HEIGHT: 64 IN | SYSTOLIC BLOOD PRESSURE: 144 MMHG | TEMPERATURE: 97.3 F

## 2023-03-15 DIAGNOSIS — K21.9 GASTROESOPHAGEAL REFLUX DISEASE, UNSPECIFIED WHETHER ESOPHAGITIS PRESENT: ICD-10-CM

## 2023-03-15 DIAGNOSIS — C49.A2 MALIGNANT GASTROINTESTINAL STROMAL TUMOR (GIST) OF STOMACH: ICD-10-CM

## 2023-03-15 DIAGNOSIS — R11.0 NAUSEA: ICD-10-CM

## 2023-03-15 DIAGNOSIS — K31.89 SUBMUCOSAL LESION OF STOMACH: ICD-10-CM

## 2023-03-15 PROCEDURE — 99213 OFFICE O/P EST LOW 20 MIN: CPT | Performed by: INTERNAL MEDICINE

## 2023-03-15 RX ORDER — DULOXETINE 20 MG/1
1 CAPSULE CAPSULE, DELAYED RELEASE PELLETS ORAL TWICE A DAY
Status: ON HOLD | COMMUNITY

## 2023-03-15 RX ORDER — ZOLPIDEM TARTRATE 10 MG/1
1 TABLET AT BEDTIME AS NEEDED TABLET, FILM COATED ORAL ONCE A DAY
Status: ACTIVE | COMMUNITY

## 2023-03-15 RX ORDER — LEVOTHYROXINE SODIUM 25 UG/1
1 TABLET IN THE MORNING ON AN EMPTY STOMACH TABLET ORAL ONCE A DAY
Status: ACTIVE | COMMUNITY

## 2023-03-15 RX ORDER — CARVEDILOL 12.5 MG/1
1 TABLET WITH FOOD TABLET, FILM COATED ORAL TWICE A DAY
Status: ACTIVE | COMMUNITY

## 2023-03-15 RX ORDER — PROMETHAZINE HYDROCHLORIDE 12.5 MG/1
1 TABLET AS NEEDED TABLET ORAL
Qty: 120 TABLET | Refills: 0 | OUTPATIENT
Start: 2023-03-15 | End: 2023-04-14

## 2023-03-15 RX ORDER — VIT A/VIT C/VIT E/ZINC/COPPER 4296-226
AS DIRECTED CAPSULE ORAL
Status: ACTIVE | COMMUNITY

## 2023-03-15 RX ORDER — GABAPENTIN 300 MG/1
1 CAPSULE CAPSULE ORAL ONCE A DAY
Status: ACTIVE | COMMUNITY

## 2023-03-15 RX ORDER — TRAMADOL HYDROCHLORIDE 5 MG/ML
5 ML 1 TO 3 HOURS BEFORE SEXUAL ACTIVITY AS NEEDED SOLUTION ORAL ONCE A DAY
Status: ACTIVE | COMMUNITY

## 2023-03-15 RX ORDER — SIMVASTATIN 20 MG/1
1 TABLET IN THE EVENING TABLET, FILM COATED ORAL ONCE A DAY
Status: ON HOLD | COMMUNITY

## 2023-03-15 RX ORDER — OMEPRAZOLE 20 MG/1
1 CAPSULE 30 MINUTES BEFORE MORNING MEAL CAPSULE, DELAYED RELEASE ORAL ONCE A DAY
Status: ACTIVE | COMMUNITY

## 2023-03-15 RX ORDER — DIPHENOXYLATE HYDROCHLORIDE AND ATROPINE SULFATE 2.5; .025 MG/1; MG/1
1 TABLET AS NEEDED TABLET ORAL
Qty: 56 TABLET | Refills: 0 | Status: ACTIVE | COMMUNITY

## 2023-03-15 NOTE — HPI-TODAY'S VISIT:
Mrs. Bassett returns for follow up. She is an 87 year old with history of GIST, untreated as seh did not tolerate gleovec, dysphagia, GERD and bloating.   When she was last seen on 2/3 she was complaining of LLQ pain, gerd and constipation. A CT was ordered  Had EGD on 2/24/2023 for follow up of GIST, GIST visualized, biopsies returned with gastropathothy with IM, Esophageal biopsies returned chronic erosive esophagitis. On ppi  Has had nausea and diarrhea since the procedure.  Reports this has been an ongoing issue some days are better than others, has what sounds like Zofran at home which occasionally helps but not often.  Does take Lomotil as needed for the diarrhea.

## 2023-05-08 ENCOUNTER — TELEPHONE ENCOUNTER (OUTPATIENT)
Dept: URBAN - METROPOLITAN AREA CLINIC 72 | Facility: CLINIC | Age: 88
End: 2023-05-08

## 2023-05-12 ENCOUNTER — TELEPHONE ENCOUNTER (OUTPATIENT)
Dept: URBAN - METROPOLITAN AREA CLINIC 113 | Facility: CLINIC | Age: 88
End: 2023-05-12

## 2023-07-25 ENCOUNTER — OFFICE VISIT (OUTPATIENT)
Dept: URBAN - METROPOLITAN AREA CLINIC 72 | Facility: CLINIC | Age: 88
End: 2023-07-25
Payer: MEDICARE

## 2023-07-25 VITALS
TEMPERATURE: 97.1 F | DIASTOLIC BLOOD PRESSURE: 77 MMHG | BODY MASS INDEX: 23.35 KG/M2 | SYSTOLIC BLOOD PRESSURE: 147 MMHG | WEIGHT: 136.8 LBS | HEART RATE: 73 BPM | HEIGHT: 64 IN

## 2023-07-25 DIAGNOSIS — K22.70 BARRETT'S ESOPHAGUS WITHOUT DYSPLASIA: ICD-10-CM

## 2023-07-25 DIAGNOSIS — K21.9 GASTROESOPHAGEAL REFLUX DISEASE, UNSPECIFIED WHETHER ESOPHAGITIS PRESENT: ICD-10-CM

## 2023-07-25 DIAGNOSIS — R11.0 NAUSEA: ICD-10-CM

## 2023-07-25 DIAGNOSIS — C49.A2 MALIGNANT GASTROINTESTINAL STROMAL TUMOR (GIST) OF STOMACH: ICD-10-CM

## 2023-07-25 PROCEDURE — 99214 OFFICE O/P EST MOD 30 MIN: CPT | Performed by: INTERNAL MEDICINE

## 2023-07-25 RX ORDER — CARVEDILOL 12.5 MG/1
1 TABLET WITH FOOD TABLET, FILM COATED ORAL TWICE A DAY
Status: ACTIVE | COMMUNITY

## 2023-07-25 RX ORDER — TRAMADOL HYDROCHLORIDE 5 MG/ML
5 ML 1 TO 3 HOURS BEFORE SEXUAL ACTIVITY AS NEEDED SOLUTION ORAL ONCE A DAY
Status: ACTIVE | COMMUNITY

## 2023-07-25 RX ORDER — OMEPRAZOLE 20 MG/1
1 CAPSULE 30 MINUTES BEFORE MORNING MEAL CAPSULE, DELAYED RELEASE ORAL ONCE A DAY
Status: ACTIVE | COMMUNITY

## 2023-07-25 RX ORDER — SIMVASTATIN 20 MG/1
1 TABLET IN THE EVENING TABLET, FILM COATED ORAL ONCE A DAY
Status: ON HOLD | COMMUNITY

## 2023-07-25 RX ORDER — DIPHENOXYLATE HYDROCHLORIDE AND ATROPINE SULFATE 2.5; .025 MG/1; MG/1
1 TABLET AS NEEDED TABLET ORAL
Qty: 56 TABLET | Refills: 0 | Status: ACTIVE | COMMUNITY

## 2023-07-25 RX ORDER — DULOXETINE 20 MG/1
1 CAPSULE CAPSULE, DELAYED RELEASE PELLETS ORAL TWICE A DAY
Status: ON HOLD | COMMUNITY

## 2023-07-25 RX ORDER — ZOLPIDEM TARTRATE 10 MG/1
1 TABLET AT BEDTIME AS NEEDED TABLET, FILM COATED ORAL ONCE A DAY
Status: ACTIVE | COMMUNITY

## 2023-07-25 RX ORDER — DEXTROMETHORPHN/ACETAMINOPH/CP 10-325-2MG
2 TABLETS EVERY 4 HOURS AS NEEDED TABLET ORAL
Status: ACTIVE | COMMUNITY

## 2023-07-25 RX ORDER — GABAPENTIN 300 MG/1
1 CAPSULE CAPSULE ORAL ONCE A DAY
Status: ACTIVE | COMMUNITY

## 2023-07-25 RX ORDER — VIT A/VIT C/VIT E/ZINC/COPPER 4296-226
AS DIRECTED CAPSULE ORAL
Status: ACTIVE | COMMUNITY

## 2023-07-25 RX ORDER — LEVOTHYROXINE SODIUM 25 UG/1
1 TABLET IN THE MORNING ON AN EMPTY STOMACH TABLET ORAL ONCE A DAY
Status: ACTIVE | COMMUNITY

## 2023-07-25 NOTE — HPI-TODAY'S VISIT:
Mrs. Bassett returns for follow-up, recall she is a pleasant 87-year-old female last seen in office on 3/15/2023.  We have been following her for nausea and GERD, Ramirez's esophagus, diarrhea.  She underwent an EGD that showed a submucosal gastric lesion. EUS done with biopsies subsequently found lesion to be a GIST.  She was briefly placed on Gleevec by oncology but did not tolerate the medication.  Endoscopic mucosal resection was discussed with the patient versus periodic surveillance with imaging.  Patient declined invasive procedures and opted for periodic surveillance with imaging.   Follow-up EGD 2/24/2023 showed ongoing submucosal lesion, biopsies showed gastropathy with intestinal metaplasia and esophageal biopsy showed erosive esophagitis.  She is been maintained on PPI.  She is opted to follow her just conservatively with imaging as needed.  We have been treating her reflux with PPI, omeprazole 20 mg daily.  Promethazine has been used as needed for nausea.  6/15/2023 she had a CT scan of the abdomen pelvis with contrast that showed a stable 4 mm right middle lobe pulmonary nodule, cholecystectomy with normal duct, mild renal cortical scarring and thinning without hydronephrosis, colonic diverticulosis bowel wall.  Decompressed stomach with stable 2.8 cm gastric mass  She is doing well without complaints today.  Not interested in any further testing for the time being but will be willing to have another CT scan in 6 months to a year.

## 2023-09-25 NOTE — PHYSICAL EXAM CARDIOVASCULAR:
Problem: Adult Behavioral Health Plan of Care  Goal: Plan of Care Review  Outcome: Ongoing, Progressing  Goal: Patient-Specific Goal (Individualization)  Outcome: Ongoing, Progressing  Goal: Adheres to Safety Considerations for Self and Others  Outcome: Ongoing, Progressing  Goal: Absence of New-Onset Illness or Injury  Outcome: Ongoing, Progressing  Goal: Optimized Coping Skills in Response to Life Stressors  Outcome: Ongoing, Progressing     Problem: Violence Risk or Actual  Goal: Anger and Impulse Control  Outcome: Ongoing, Progressing     Problem: Behavior Regulation Impairment (Psychotic Signs/Symptoms)  Goal: Improved Behavioral Control (Psychotic Signs/Symptoms)  Outcome: Ongoing, Progressing     Problem: Cognitive Impairment (Psychotic Signs/Symptoms)  Goal: Optimal Cognitive Function (Psychotic Signs/Symptoms)  Outcome: Ongoing, Progressing     Problem: Decreased Participation and Engagement (Psychotic Signs/Symptoms)  Goal: Increased Participation and Engagement (Psychotic Signs/Symptoms)  Outcome: Ongoing, Progressing     Problem: Mood Impairment (Psychotic Signs/Symptoms)  Goal: Improved Mood Symptoms (Psychotic Signs/Symptoms)  Outcome: Ongoing, Progressing     Problem: Psychomotor Impairment (Psychotic Signs/Symptoms)  Goal: Improved Psychomotor Symptoms (Psychotic Signs/Symptoms)  Outcome: Ongoing, Progressing     Problem: Sensory Perception Impairment (Psychotic Signs/Symptoms)  Goal: Decreased Sensory Symptoms (Psychotic Signs/Symptoms)  Outcome: Ongoing, Progressing     Problem: Sleep Disturbance (Psychotic Signs/Symptoms)  Goal: Improved Sleep (Psychotic Signs/Symptoms)  Outcome: Ongoing, Progressing     Problem: Social, Occupational or Functional Impairment (Psychotic Signs/Symptoms)  Goal: Enhanced Social, Occupational or Functional Skills (Psychotic Signs/Symptoms)  Outcome: Ongoing, Progressing     Problem: Activity and Energy Impairment (Anxiety Signs/Symptoms)  Goal: Optimized Energy Level  regular rate and rhythm , S1, S2 normal ,  , no murmurs, rubs, gallops , no edema (Anxiety Signs/Symptoms)  Outcome: Ongoing, Progressing     Problem: Cognitive Impairment (Anxiety Signs/Symptoms)  Goal: Optimized Cognitive Function (Anxiety Signs/Symptoms)  Outcome: Ongoing, Progressing     Problem: Mood Impairment (Anxiety Signs/Symptoms)  Goal: Improved Mood Symptoms (Anxiety Signs/Symptoms)  Outcome: Ongoing, Progressing     Problem: Sleep Impairment (Anxiety Signs/Symptoms)  Goal: Improved Sleep (Anxiety Signs/Symptoms)  Outcome: Ongoing, Progressing     Problem: Social, Occupational or Functional Impairment (Anxiety Signs/Symptoms)  Goal: Enhanced Social, Occupational or Functional Skills (Anxiety Signs/Symptoms)  Outcome: Ongoing, Progressing     Problem: Somatic Disturbance (Anxiety Signs/Symptoms)  Goal: Improved Somatic Symptoms (Anxiety Signs/Symptoms)  Outcome: Ongoing, Progressing     Problem: Activity and Energy Impairment (Excessive Substance Use)  Goal: Optimized Energy Level (Excessive Substance Use)  Outcome: Ongoing, Progressing     Problem: Behavior Regulation Impairment (Excessive Substance Use)  Goal: Improved Behavioral Control (Excessive Substance Use)  Outcome: Ongoing, Progressing     Problem: Decreased Participation and Engagement (Excessive Substance Use)  Goal: Increased Participation and Engagement (Excessive Substance Use)  Outcome: Ongoing, Progressing     Problem: Physiologic Impairment (Excessive Substance Use)  Goal: Improved Physiologic Symptoms (Excessive Substance Use)  Outcome: Ongoing, Progressing     Problem: Social, Occupational or Functional Impairment (Excessive Substance Use)  Goal: Enhanced Social, Occupational or Functional Skills (Excessive Substance Use)  Outcome: Ongoing, Progressing     Problem: Activity and Energy Impairment (Depressive Signs/Symptoms)  Goal: Optimized Energy Level (Depressive Signs/Symptoms)  Outcome: Ongoing, Progressing     Problem: Cognitive Impairment (Depressive Signs/Symptoms)  Goal: Optimized Cognitive Function  Outcome:  Ongoing, Progressing     Problem: Decreased Participation/Engagement (Depressive Signs/Symptoms)  Goal: Increased Participation and Engagement (Depressive Signs/Symptoms)  Outcome: Ongoing, Progressing     Problem: Feelings of Worthlessness, Hopelessness or Excessive Guilt (Depressive Signs/Symptoms)  Goal: Enhanced Self-Esteem and Confidence (Depressive Signs/Symptoms)  Outcome: Ongoing, Progressing     Problem: Mood Impairment (Depressive Signs/Symptoms)  Goal: Improved Mood Symptoms (Depressive Signs/Symptoms)  Outcome: Ongoing, Progressing     Problem: Nutrition Imbalance (Depressive Signs/Symptoms)  Goal: Optimized Nutrition Intake  Outcome: Ongoing, Progressing     Problem: Psychomotor Impairment (Depressive Signs/Symptoms)  Goal: Improved Psychomotor Symptoms (Depressive Signs/Symptoms)  Outcome: Ongoing, Progressing     Problem: Sleep Disturbance (Depressive Signs/Symptoms)  Goal: Improved Sleep (Depressive Signs/Symptoms)  Outcome: Ongoing, Progressing     Problem: Social, Occupational or Functional Impairment (Depressive Signs/Symptoms)  Goal: Enhanced Social, Occupational or Functional Skills (Depressive Signs/Symptoms)  Outcome: Ongoing, Progressing

## 2024-01-02 ENCOUNTER — LAB OUTSIDE AN ENCOUNTER (OUTPATIENT)
Dept: URBAN - METROPOLITAN AREA CLINIC 72 | Facility: CLINIC | Age: 89
End: 2024-01-02

## 2024-01-02 ENCOUNTER — OFFICE VISIT (OUTPATIENT)
Dept: URBAN - METROPOLITAN AREA CLINIC 72 | Facility: CLINIC | Age: 89
End: 2024-01-02
Payer: MEDICARE

## 2024-01-02 VITALS
DIASTOLIC BLOOD PRESSURE: 79 MMHG | BODY MASS INDEX: 22.77 KG/M2 | TEMPERATURE: 97.3 F | HEART RATE: 65 BPM | WEIGHT: 133.4 LBS | SYSTOLIC BLOOD PRESSURE: 186 MMHG | HEIGHT: 64 IN

## 2024-01-02 DIAGNOSIS — R13.19 ESOPHAGEAL DYSPHAGIA: ICD-10-CM

## 2024-01-02 DIAGNOSIS — K21.9 GASTROESOPHAGEAL REFLUX DISEASE, UNSPECIFIED WHETHER ESOPHAGITIS PRESENT: ICD-10-CM

## 2024-01-02 DIAGNOSIS — C49.A2 MALIGNANT GASTROINTESTINAL STROMAL TUMOR (GIST) OF STOMACH: ICD-10-CM

## 2024-01-02 DIAGNOSIS — K22.70 BARRETT'S ESOPHAGUS WITHOUT DYSPLASIA: ICD-10-CM

## 2024-01-02 PROCEDURE — 99214 OFFICE O/P EST MOD 30 MIN: CPT | Performed by: INTERNAL MEDICINE

## 2024-01-02 RX ORDER — DULOXETINE 20 MG/1
1 CAPSULE CAPSULE, DELAYED RELEASE PELLETS ORAL TWICE A DAY
Status: ON HOLD | COMMUNITY

## 2024-01-02 RX ORDER — SIMVASTATIN 20 MG/1
1 TABLET IN THE EVENING TABLET, FILM COATED ORAL ONCE A DAY
Status: ON HOLD | COMMUNITY

## 2024-01-02 RX ORDER — TRAMADOL HYDROCHLORIDE 5 MG/ML
5 ML 1 TO 3 HOURS BEFORE SEXUAL ACTIVITY AS NEEDED SOLUTION ORAL ONCE A DAY
Status: ON HOLD | COMMUNITY

## 2024-01-02 RX ORDER — VIT A/VIT C/VIT E/ZINC/COPPER 4296-226
AS DIRECTED CAPSULE ORAL
Status: ACTIVE | COMMUNITY

## 2024-01-02 RX ORDER — OMEPRAZOLE 20 MG/1
1 CAPSULE 30 MINUTES BEFORE MORNING MEAL CAPSULE, DELAYED RELEASE ORAL ONCE A DAY
Status: ACTIVE | COMMUNITY

## 2024-01-02 RX ORDER — GABAPENTIN 300 MG/1
1 CAPSULE CAPSULE ORAL ONCE A DAY
Status: ACTIVE | COMMUNITY

## 2024-01-02 RX ORDER — CARVEDILOL 12.5 MG/1
1 TABLET WITH FOOD TABLET, FILM COATED ORAL TWICE A DAY
Status: ACTIVE | COMMUNITY

## 2024-01-02 RX ORDER — DEXTROMETHORPHN/ACETAMINOPH/CP 10-325-2MG
2 TABLETS EVERY 4 HOURS AS NEEDED TABLET ORAL
Status: ACTIVE | COMMUNITY

## 2024-01-02 RX ORDER — ZOLPIDEM TARTRATE 10 MG/1
1 TABLET AT BEDTIME AS NEEDED TABLET, FILM COATED ORAL ONCE A DAY
Status: ACTIVE | COMMUNITY

## 2024-01-02 RX ORDER — LEVOTHYROXINE SODIUM 25 UG/1
1 TABLET IN THE MORNING ON AN EMPTY STOMACH TABLET ORAL ONCE A DAY
Status: ACTIVE | COMMUNITY

## 2024-01-02 RX ORDER — DIPHENOXYLATE HYDROCHLORIDE AND ATROPINE SULFATE 2.5; .025 MG/1; MG/1
1 TABLET AS NEEDED TABLET ORAL
Qty: 56 TABLET | Refills: 0 | Status: ACTIVE | COMMUNITY

## 2024-01-02 NOTE — HPI-TODAY'S VISIT:
Mrs. Bassett returns for follow-up.  Recall she is a pleasant 88-year-old female last seen in office on 7/25/2023.  She has a history of Ramirez's mucosa and just and has declined any type of treatment for her GIST.  She did not tolerate oral Gleevec. We have been monitoring this with periodic imaging.  She is on omeprazole 20 mg daily for reflux management.EGD 2/24/2023 showed submucosal lesion biopsies confirmed intestinal metaplasia and erosive esophagitis.CT scan 6/15/2023 showed stable 2.8 cm gastric mass.  She reports that she is feeling bloated and full.  She has a gurgling when she swallows and feels as though food does not pass down.  She is not interested in any invasive testing or EGD.  She is not interested in dilation either.  She is due for surveillance CT scan to follow-up on her GIST.  She is wondering if it just has grown causing her to feel bloated and full.  She is not having any difficulty with bowel movements and has not had any weight loss.

## 2024-01-02 NOTE — EXAM-GENERAL EXAMINATION
General--no acute distress, resting comfortably Eyes--anicteric, no pallor HENT--normocephalic, atraumatic head Neck--no lymphadenopathy, non tender Chest--non labored breathing, equal rise Abdomen--soft, non tender, non distended, no organomegaly Ext: HANY, no obvious sores or rashes Psych: appropriate mood and affect Neuro--alert and oriented, answers appropriately

## 2024-01-12 ENCOUNTER — TELEPHONE ENCOUNTER (OUTPATIENT)
Dept: URBAN - METROPOLITAN AREA CLINIC 72 | Facility: CLINIC | Age: 89
End: 2024-01-12

## 2024-01-19 ENCOUNTER — TELEPHONE ENCOUNTER (OUTPATIENT)
Dept: URBAN - METROPOLITAN AREA CLINIC 72 | Facility: CLINIC | Age: 89
End: 2024-01-19

## 2024-01-22 ENCOUNTER — TELEPHONE ENCOUNTER (OUTPATIENT)
Dept: URBAN - METROPOLITAN AREA CLINIC 113 | Facility: CLINIC | Age: 89
End: 2024-01-22

## 2024-02-20 ENCOUNTER — OV EP (OUTPATIENT)
Dept: URBAN - METROPOLITAN AREA CLINIC 72 | Facility: CLINIC | Age: 89
End: 2024-02-20
Payer: MEDICARE

## 2024-02-20 VITALS
HEART RATE: 69 BPM | HEIGHT: 64 IN | SYSTOLIC BLOOD PRESSURE: 103 MMHG | WEIGHT: 131 LBS | DIASTOLIC BLOOD PRESSURE: 53 MMHG | TEMPERATURE: 97.1 F | BODY MASS INDEX: 22.36 KG/M2

## 2024-02-20 DIAGNOSIS — K21.9 GASTROESOPHAGEAL REFLUX DISEASE, UNSPECIFIED WHETHER ESOPHAGITIS PRESENT: ICD-10-CM

## 2024-02-20 DIAGNOSIS — C49.A2 MALIGNANT GASTROINTESTINAL STROMAL TUMOR (GIST) OF STOMACH: ICD-10-CM

## 2024-02-20 DIAGNOSIS — R11.0 NAUSEA: ICD-10-CM

## 2024-02-20 PROCEDURE — 99213 OFFICE O/P EST LOW 20 MIN: CPT | Performed by: INTERNAL MEDICINE

## 2024-02-20 RX ORDER — GLIMEPIRIDE 1 MG/1
1 TABLET WITH BREAKFAST OR THE FIRST MAIN MEAL OF THE DAY TABLET ORAL ONCE A DAY
Status: ACTIVE | COMMUNITY

## 2024-02-20 RX ORDER — ROSUVASTATIN CALCIUM 10 MG/1
1 TABLET TABLET, COATED ORAL ONCE A DAY
Status: ACTIVE | COMMUNITY

## 2024-02-20 RX ORDER — PREDNISONE 2.5 MG/1
1 TABLET TABLET ORAL ONCE A DAY
Status: ACTIVE | COMMUNITY

## 2024-02-20 RX ORDER — LEVOTHYROXINE SODIUM 25 UG/1
1 TABLET IN THE MORNING ON AN EMPTY STOMACH TABLET ORAL ONCE A DAY
Status: ACTIVE | COMMUNITY

## 2024-02-20 RX ORDER — VIT A/VIT C/VIT E/ZINC/COPPER 4296-226
AS DIRECTED CAPSULE ORAL
Status: ACTIVE | COMMUNITY

## 2024-02-20 RX ORDER — DULOXETINE 20 MG/1
1 CAPSULE CAPSULE, DELAYED RELEASE PELLETS ORAL TWICE A DAY
Status: ON HOLD | COMMUNITY

## 2024-02-20 RX ORDER — OMEPRAZOLE 20 MG/1
1 CAPSULE 30 MINUTES BEFORE MORNING MEAL CAPSULE, DELAYED RELEASE ORAL ONCE A DAY
Status: ACTIVE | COMMUNITY

## 2024-02-20 RX ORDER — TRAMADOL HYDROCHLORIDE 5 MG/ML
5 ML 1 TO 3 HOURS BEFORE SEXUAL ACTIVITY AS NEEDED SOLUTION ORAL ONCE A DAY
Status: ON HOLD | COMMUNITY

## 2024-02-20 RX ORDER — SIMVASTATIN 20 MG/1
1 TABLET IN THE EVENING TABLET, FILM COATED ORAL ONCE A DAY
Status: ON HOLD | COMMUNITY

## 2024-02-20 RX ORDER — CARVEDILOL 12.5 MG/1
1 TABLET WITH FOOD TABLET, FILM COATED ORAL TWICE A DAY
Status: ACTIVE | COMMUNITY

## 2024-02-20 RX ORDER — DIPHENOXYLATE HYDROCHLORIDE AND ATROPINE SULFATE 2.5; .025 MG/1; MG/1
1 TABLET AS NEEDED TABLET ORAL
Qty: 56 TABLET | Refills: 0 | Status: ACTIVE | COMMUNITY

## 2024-02-20 RX ORDER — ASCORBIC ACID 1000 MG
1 CAPSULE TABLET ORAL ONCE A DAY
Status: ACTIVE | COMMUNITY

## 2024-02-20 RX ORDER — DEXTROMETHORPHN/ACETAMINOPH/CP 10-325-2MG
2 TABLETS EVERY 4 HOURS AS NEEDED TABLET ORAL
Status: ACTIVE | COMMUNITY

## 2024-02-20 RX ORDER — LISINOPRIL 5 MG/1
1 TABLET TABLET ORAL ONCE A DAY
Status: ACTIVE | COMMUNITY

## 2024-02-20 RX ORDER — ZOLPIDEM TARTRATE 10 MG/1
1 TABLET AT BEDTIME AS NEEDED TABLET, FILM COATED ORAL ONCE A DAY
Status: ACTIVE | COMMUNITY

## 2024-02-20 RX ORDER — GABAPENTIN 300 MG/1
1 CAPSULE CAPSULE ORAL ONCE A DAY
Status: ACTIVE | COMMUNITY

## 2024-02-20 NOTE — HPI-TODAY'S VISIT:
Mrs. Bassett returns for follow-up, recall she is an 88-year-old female last seen in office on 1/2/2024.  She has a history of Ramirez's esophagus, GIST, and nausea.  She was trialed on Gleevec but could not tolerate secondary to nausea.  She is being monitored with periodic imaging of the lesion via CT scan.  Her reflux has been under good control with omeprazole 20 mg daily.  Last EGD was in 2/24/2023.When we saw her last she was having issues with bloating and fullness had a gurgling sensation when she would swallow feeling as though food did not pass down.  She was not interested in any further invasive testing or an endoscopy for this.  She did request follow-up imaging for her just for further evaluation. CT scan of the abdomen pelvis with contrast done 1/26/2024 showed coronary artery calcifications, mild intrahepatic biliary dilatation with a flash filling hemangioma, status postcholecystectomy anatomy, thickening of the bilateral adrenal glands and a simple left renal cyst, similar gastric mass, scattered colonic diverticulosis without obstruction and degenerative changes of the spine with a 2.9cm sebaceous or epidermal inclusion cyst overlying the sternum  She was hospitalized with nausea vomiting diarrhea recently.  Workup and hospitalization were relatively unremarkable.  She is still dealing with issues with nausea.

## 2024-02-20 NOTE — EXAM-GENERAL EXAMINATION
General--no acute distress, resting comfortably Eyes--anicteric, no pallor HENT--normocephalic, atraumatic head Neck--no lymphadenopathy, non tender Chest--non labored breathing, equal rise Abdomen--soft, non tender, non distended, no organomegaly Ext: HANY, no obvious sores or rashes

## 2024-03-06 ENCOUNTER — OV EP (OUTPATIENT)
Dept: URBAN - METROPOLITAN AREA CLINIC 72 | Facility: CLINIC | Age: 89
End: 2024-03-06

## 2024-05-24 ENCOUNTER — TELEPHONE ENCOUNTER (OUTPATIENT)
Dept: URBAN - METROPOLITAN AREA CLINIC 72 | Facility: CLINIC | Age: 89
End: 2024-05-24

## 2024-05-28 ENCOUNTER — LAB OUTSIDE AN ENCOUNTER (OUTPATIENT)
Dept: URBAN - METROPOLITAN AREA CLINIC 72 | Facility: CLINIC | Age: 89
End: 2024-05-28

## 2024-06-11 ENCOUNTER — TELEPHONE ENCOUNTER (OUTPATIENT)
Dept: URBAN - METROPOLITAN AREA CLINIC 72 | Facility: CLINIC | Age: 89
End: 2024-06-11

## 2024-07-02 ENCOUNTER — OFFICE VISIT (OUTPATIENT)
Dept: URBAN - METROPOLITAN AREA CLINIC 72 | Facility: CLINIC | Age: 89
End: 2024-07-02

## 2024-07-16 ENCOUNTER — OFFICE VISIT (OUTPATIENT)
Dept: URBAN - METROPOLITAN AREA CLINIC 72 | Facility: CLINIC | Age: 89
End: 2024-07-16
Payer: MEDICARE

## 2024-07-16 ENCOUNTER — DASHBOARD ENCOUNTERS (OUTPATIENT)
Age: 89
End: 2024-07-16

## 2024-07-16 VITALS
HEIGHT: 64 IN | TEMPERATURE: 97.7 F | SYSTOLIC BLOOD PRESSURE: 161 MMHG | BODY MASS INDEX: 21.13 KG/M2 | DIASTOLIC BLOOD PRESSURE: 78 MMHG | WEIGHT: 123.8 LBS | HEART RATE: 63 BPM

## 2024-07-16 DIAGNOSIS — K21.9 GASTROESOPHAGEAL REFLUX DISEASE, UNSPECIFIED WHETHER ESOPHAGITIS PRESENT: ICD-10-CM

## 2024-07-16 DIAGNOSIS — C49.A2 MALIGNANT GASTROINTESTINAL STROMAL TUMOR (GIST) OF STOMACH: ICD-10-CM

## 2024-07-16 DIAGNOSIS — R19.7 DIARRHEA, UNSPECIFIED TYPE: ICD-10-CM

## 2024-07-16 DIAGNOSIS — K22.70 BARRETT'S ESOPHAGUS WITHOUT DYSPLASIA: ICD-10-CM

## 2024-07-16 PROCEDURE — 99214 OFFICE O/P EST MOD 30 MIN: CPT | Performed by: INTERNAL MEDICINE

## 2024-07-16 RX ORDER — GLIMEPIRIDE 1 MG/1
1 TABLET WITH BREAKFAST OR THE FIRST MAIN MEAL OF THE DAY TABLET ORAL ONCE A DAY
Status: ACTIVE | COMMUNITY

## 2024-07-16 RX ORDER — DIPHENOXYLATE HYDROCHLORIDE AND ATROPINE SULFATE 2.5; .025 MG/1; MG/1
1 TABLET AS NEEDED TABLET ORAL
Qty: 56 TABLET | Refills: 0 | Status: ACTIVE | COMMUNITY

## 2024-07-16 RX ORDER — CARVEDILOL 12.5 MG/1
1 TABLET WITH FOOD TABLET, FILM COATED ORAL TWICE A DAY
Status: ACTIVE | COMMUNITY

## 2024-07-16 RX ORDER — SIMVASTATIN 20 MG/1
1 TABLET IN THE EVENING TABLET, FILM COATED ORAL ONCE A DAY
Status: ON HOLD | COMMUNITY

## 2024-07-16 RX ORDER — ZOLPIDEM TARTRATE 10 MG/1
1 TABLET AT BEDTIME AS NEEDED TABLET, FILM COATED ORAL ONCE A DAY
Status: ACTIVE | COMMUNITY

## 2024-07-16 RX ORDER — OMEPRAZOLE 20 MG/1
1 CAPSULE 30 MINUTES BEFORE MORNING MEAL CAPSULE, DELAYED RELEASE ORAL ONCE A DAY
Status: ACTIVE | COMMUNITY

## 2024-07-16 RX ORDER — LEVOTHYROXINE SODIUM 25 UG/1
1 TABLET IN THE MORNING ON AN EMPTY STOMACH TABLET ORAL ONCE A DAY
Status: ACTIVE | COMMUNITY

## 2024-07-16 RX ORDER — DULOXETINE 20 MG/1
1 CAPSULE CAPSULE, DELAYED RELEASE PELLETS ORAL TWICE A DAY
Status: ON HOLD | COMMUNITY

## 2024-07-16 RX ORDER — VIT A/VIT C/VIT E/ZINC/COPPER 4296-226
AS DIRECTED CAPSULE ORAL
Status: ACTIVE | COMMUNITY

## 2024-07-16 RX ORDER — GABAPENTIN 300 MG/1
1 CAPSULE CAPSULE ORAL ONCE A DAY
Status: ACTIVE | COMMUNITY

## 2024-07-16 RX ORDER — DEXTROMETHORPHN/ACETAMINOPH/CP 10-325-2MG
2 TABLETS EVERY 4 HOURS AS NEEDED TABLET ORAL
Status: ACTIVE | COMMUNITY

## 2024-07-16 RX ORDER — OMEPRAZOLE 20 MG/1
1 CAPSULE 30 MINUTES BEFORE MORNING MEAL CAPSULE, DELAYED RELEASE ORAL ONCE A DAY
Qty: 90 | Refills: 1

## 2024-07-16 RX ORDER — TRAMADOL HYDROCHLORIDE 5 MG/ML
5 ML 1 TO 3 HOURS BEFORE SEXUAL ACTIVITY AS NEEDED SOLUTION ORAL ONCE A DAY
Status: ON HOLD | COMMUNITY

## 2024-07-16 RX ORDER — ROSUVASTATIN CALCIUM 10 MG/1
1 TABLET TABLET, COATED ORAL ONCE A DAY
Status: ACTIVE | COMMUNITY

## 2024-07-16 RX ORDER — PREDNISONE 2.5 MG/1
1 TABLET TABLET ORAL ONCE A DAY
Status: ACTIVE | COMMUNITY

## 2024-07-16 RX ORDER — LISINOPRIL 5 MG/1
1 TABLET TWICE A DAY TABLET ORAL ONCE A DAY
Status: ACTIVE | COMMUNITY

## 2024-07-16 RX ORDER — ASCORBIC ACID 1000 MG
1 CAPSULE TABLET ORAL ONCE A DAY
Status: ACTIVE | COMMUNITY

## 2024-07-16 NOTE — HPI-TODAY'S VISIT:
Mrs. Bassett returns for follow-up.  Recall she is a pleasant 88-year-old last seen in office on 2/20/2024.  She has a history of reflux, nausea and a malignant GIST of the stomach which we have been following conservatively.  Will follow with routine imaging.  She was briefly trialed on Gleevec did not tolerate medication.  Her last EGD was 2/24/2023.  CT scan of the abdomen pelvis with contrast 1/26/2024 showed coronary artery calcification, mild intrahepatic biliary dilatation with last filling of angioma, status post cholecystectomy anatomy, thickening of the adrenal glands and a simple cyst.  Unchanged gastric mass and colonic diverticulosis.  When we last saw her she had been hospitalized for nausea and vomiting, she was still somewhat symptomatic in May having nausea vomiting and diarrhea imaging was ordered.  6/4/2024 she had a CT scan of the abdomen pelvis with contrast that showed stable 5 mm pleural based middle lobe nodule, surgically absent gallbladder, 3.7 enhancing mass involving gastric body again noted slightly increased in prominence from 1/26/2024 where it measured 3.2, there is no adjacent lymphadenopathy, unchanged diverticulosis and she had ongoing atherosclerotic disease.  She is feeling well today, no issues with diarrhea still occasional burning and bloating, issues with reflux and dysphagia as well.  Not interested in dilation at this point time.

## 2024-07-16 NOTE — EXAM-PHYSICAL EXAM
General--no acute distress, resting comfortably Eyes--anicteric, no pallor HENT--normocephalic, atraumatic head Neck--no lymphadenopathy, symmetric Chest--non labored breathing, equal rise Abdomen--soft, non tender, non distended, no organomegaly Ext: HANY, no obvious sores or rashes

## 2024-12-04 ENCOUNTER — LAB OUTSIDE AN ENCOUNTER (OUTPATIENT)
Dept: URBAN - METROPOLITAN AREA CLINIC 72 | Facility: CLINIC | Age: 89
End: 2024-12-04

## 2024-12-04 ENCOUNTER — OFFICE VISIT (OUTPATIENT)
Dept: URBAN - METROPOLITAN AREA CLINIC 72 | Facility: CLINIC | Age: 89
End: 2024-12-04
Payer: MEDICARE

## 2024-12-04 VITALS
OXYGEN SATURATION: 99 % | HEIGHT: 64 IN | TEMPERATURE: 98.1 F | DIASTOLIC BLOOD PRESSURE: 75 MMHG | BODY MASS INDEX: 20.79 KG/M2 | SYSTOLIC BLOOD PRESSURE: 130 MMHG | WEIGHT: 121.8 LBS | HEART RATE: 60 BPM

## 2024-12-04 DIAGNOSIS — R10.33 PERIUMBILICAL ABDOMINAL PAIN: ICD-10-CM

## 2024-12-04 DIAGNOSIS — R11.0 CHRONIC NAUSEA: ICD-10-CM

## 2024-12-04 DIAGNOSIS — K21.9 GASTROESOPHAGEAL REFLUX DISEASE, UNSPECIFIED WHETHER ESOPHAGITIS PRESENT: ICD-10-CM

## 2024-12-04 DIAGNOSIS — K57.30 DIVERTICULA OF COLON: ICD-10-CM

## 2024-12-04 DIAGNOSIS — C49.A2 MALIGNANT GASTROINTESTINAL STROMAL TUMOR (GIST) OF STOMACH: ICD-10-CM

## 2024-12-04 DIAGNOSIS — R63.0 DECREASED APPETITE: ICD-10-CM

## 2024-12-04 DIAGNOSIS — K22.70 BARRETT'S ESOPHAGUS WITHOUT DYSPLASIA: ICD-10-CM

## 2024-12-04 PROBLEM — 64379006: Status: ACTIVE | Noted: 2024-12-04

## 2024-12-04 PROCEDURE — 99214 OFFICE O/P EST MOD 30 MIN: CPT | Performed by: INTERNAL MEDICINE

## 2024-12-04 RX ORDER — GABAPENTIN 300 MG/1
1 CAPSULE CAPSULE ORAL ONCE A DAY
Status: ACTIVE | COMMUNITY

## 2024-12-04 RX ORDER — LISINOPRIL 5 MG/1
1 TABLET TWICE A DAY TABLET ORAL ONCE A DAY
Status: ACTIVE | COMMUNITY

## 2024-12-04 RX ORDER — DRONABINOL 2.5 MG/1
1 CAPSULE 1 HOUR BEFORE BEDTIME CAPSULE ORAL ONCE A DAY
Qty: 30 | Refills: 1 | OUTPATIENT
Start: 2024-12-04 | End: 2025-02-02

## 2024-12-04 RX ORDER — GLIMEPIRIDE 1 MG/1
1 TABLET WITH BREAKFAST OR THE FIRST MAIN MEAL OF THE DAY TABLET ORAL ONCE A DAY
Status: ACTIVE | COMMUNITY

## 2024-12-04 RX ORDER — DIPHENOXYLATE HYDROCHLORIDE AND ATROPINE SULFATE 2.5; .025 MG/1; MG/1
1 TABLET AS NEEDED TABLET ORAL
Qty: 56 TABLET | Refills: 0 | Status: ACTIVE | COMMUNITY

## 2024-12-04 RX ORDER — CARVEDILOL 12.5 MG/1
1 TABLET WITH FOOD TABLET, FILM COATED ORAL TWICE A DAY
Status: ACTIVE | COMMUNITY

## 2024-12-04 RX ORDER — OMEPRAZOLE 20 MG/1
1 CAPSULE 30 MINUTES BEFORE MORNING MEAL CAPSULE, DELAYED RELEASE ORAL ONCE A DAY
Qty: 90 | Refills: 1 | Status: ACTIVE | COMMUNITY

## 2024-12-04 RX ORDER — ASCORBIC ACID 1000 MG
1 CAPSULE TABLET ORAL ONCE A DAY
Status: ACTIVE | COMMUNITY

## 2024-12-04 RX ORDER — ROSUVASTATIN CALCIUM 10 MG/1
1 TABLET TABLET, COATED ORAL ONCE A DAY
Status: ACTIVE | COMMUNITY

## 2024-12-04 RX ORDER — DULOXETINE 20 MG/1
1 CAPSULE CAPSULE, DELAYED RELEASE PELLETS ORAL TWICE A DAY
Status: ON HOLD | COMMUNITY

## 2024-12-04 RX ORDER — SIMVASTATIN 20 MG/1
1 TABLET IN THE EVENING TABLET, FILM COATED ORAL ONCE A DAY
Status: ON HOLD | COMMUNITY

## 2024-12-04 RX ORDER — VIT A/VIT C/VIT E/ZINC/COPPER 4296-226
AS DIRECTED CAPSULE ORAL
Status: ACTIVE | COMMUNITY

## 2024-12-04 RX ORDER — TRAMADOL HYDROCHLORIDE 5 MG/ML
5 ML 1 TO 3 HOURS BEFORE SEXUAL ACTIVITY AS NEEDED SOLUTION ORAL ONCE A DAY
Status: ON HOLD | COMMUNITY

## 2024-12-04 RX ORDER — PREDNISONE 2.5 MG/1
1 TABLET TABLET ORAL ONCE A DAY
Status: ACTIVE | COMMUNITY

## 2024-12-04 RX ORDER — ZOLPIDEM TARTRATE 10 MG/1
1 TABLET AT BEDTIME AS NEEDED TABLET, FILM COATED ORAL ONCE A DAY
Status: ACTIVE | COMMUNITY

## 2024-12-04 RX ORDER — DEXTROMETHORPHN/ACETAMINOPH/CP 10-325-2MG
2 TABLETS EVERY 4 HOURS AS NEEDED TABLET ORAL
Status: ACTIVE | COMMUNITY

## 2024-12-04 RX ORDER — LEVOTHYROXINE SODIUM 25 UG/1
1 TABLET IN THE MORNING ON AN EMPTY STOMACH TABLET ORAL ONCE A DAY
Status: ACTIVE | COMMUNITY

## 2024-12-04 NOTE — HPI-TODAY'S VISIT:
Mrs. Bassett returns for follow-up.  Recall she is a pleasant 89-year-old last seen in office on 2024.  She has a history of reflux, nausea, abdominal pain and malignant GIST of the stomach being followed with CT surveillance.  She did not tolerate Gleevec.  EGD 2023 revealed GIST and Ramirez's mucosa.  She is on omeprazole 20 mg daily.  She has been suffering from vomiting and nausea actually requiring hospitalization from this.  [id number] CT scan abdomen pelvis with contrast had coronary artery calcification, postcholecystectomy anatomy, mild intrahepatic ductal dilatation, hemangioma of the liver, thickened adrenal glands, unchanged gastric mass and colonic diverticulosis  2024 CT scan abdomen pelvis with contrast due to nausea had 5 mm pulmonary nodule, cholecystectomy anatomy, 3.7 cm enhancing mass involving gastric body slightly increased in prominence from prior imaging where it measured 3.2 cm with no adjacent lymphadenopathy, atherosclerotic disease, unchanged diverticulosis.

## 2025-03-31 ENCOUNTER — COMPREHENSIVE EXAM (OUTPATIENT)
Age: OVER 89
End: 2025-03-31

## 2025-03-31 DIAGNOSIS — H25.13: ICD-10-CM

## 2025-03-31 DIAGNOSIS — H43.813: ICD-10-CM

## 2025-03-31 DIAGNOSIS — H35.3212: ICD-10-CM

## 2025-03-31 DIAGNOSIS — H35.3134: ICD-10-CM

## 2025-03-31 DIAGNOSIS — H35.433: ICD-10-CM

## 2025-03-31 PROCEDURE — 99214 OFFICE O/P EST MOD 30 MIN: CPT

## 2025-03-31 PROCEDURE — 92134 CPTRZ OPH DX IMG PST SGM RTA: CPT

## 2025-06-03 ENCOUNTER — CONSULTATION/EVALUATION (OUTPATIENT)
Age: OVER 89
End: 2025-06-03

## 2025-06-03 VITALS
HEIGHT: 55 IN | DIASTOLIC BLOOD PRESSURE: 78 MMHG | WEIGHT: 120 LBS | HEART RATE: 64 BPM | BODY MASS INDEX: 27.77 KG/M2 | SYSTOLIC BLOOD PRESSURE: 150 MMHG

## 2025-06-03 DIAGNOSIS — H35.3134: ICD-10-CM

## 2025-06-03 DIAGNOSIS — H25.813: ICD-10-CM

## 2025-06-03 DIAGNOSIS — H35.3212: ICD-10-CM

## 2025-06-03 PROCEDURE — 99214 OFFICE O/P EST MOD 30 MIN: CPT

## 2025-06-03 PROCEDURE — 92134 CPTRZ OPH DX IMG PST SGM RTA: CPT

## 2025-06-03 PROCEDURE — 92136 OPHTHALMIC BIOMETRY: CPT

## 2025-06-25 ENCOUNTER — SURGERY/PROCEDURE (OUTPATIENT)
Age: OVER 89
End: 2025-06-25

## 2025-06-25 DIAGNOSIS — H25.812: ICD-10-CM

## 2025-06-25 PROCEDURE — 66984 XCAPSL CTRC RMVL W/O ECP: CPT

## 2025-06-26 ENCOUNTER — POST-OP (OUTPATIENT)
Age: OVER 89
End: 2025-06-26

## 2025-06-26 DIAGNOSIS — Z96.1: ICD-10-CM

## 2025-07-09 ENCOUNTER — SURGERY/PROCEDURE (OUTPATIENT)
Age: OVER 89
End: 2025-07-09

## 2025-07-09 DIAGNOSIS — H25.811: ICD-10-CM

## 2025-07-09 PROCEDURE — 66984 XCAPSL CTRC RMVL W/O ECP: CPT | Mod: 79,RT

## 2025-07-10 ENCOUNTER — POST-OP (OUTPATIENT)
Age: OVER 89
End: 2025-07-10

## 2025-07-10 DIAGNOSIS — Z96.1: ICD-10-CM

## 2025-07-30 ENCOUNTER — POST-OP (OUTPATIENT)
Age: OVER 89
End: 2025-07-30

## 2025-07-30 DIAGNOSIS — Z96.1: ICD-10-CM

## 2025-07-30 DIAGNOSIS — H52.223: ICD-10-CM

## 2025-07-30 PROCEDURE — 92015 DETERMINE REFRACTIVE STATE: CPT
